# Patient Record
Sex: MALE | HISPANIC OR LATINO
[De-identification: names, ages, dates, MRNs, and addresses within clinical notes are randomized per-mention and may not be internally consistent; named-entity substitution may affect disease eponyms.]

---

## 2024-06-17 ENCOUNTER — DASHBOARD ENCOUNTERS (OUTPATIENT)
Age: 34
End: 2024-06-17

## 2024-06-17 ENCOUNTER — LAB OUTSIDE AN ENCOUNTER (OUTPATIENT)
Dept: URBAN - METROPOLITAN AREA CLINIC 72 | Facility: CLINIC | Age: 34
End: 2024-06-17

## 2024-06-17 ENCOUNTER — OFFICE VISIT (OUTPATIENT)
Dept: URBAN - METROPOLITAN AREA CLINIC 72 | Facility: CLINIC | Age: 34
End: 2024-06-17
Payer: SELF-PAY

## 2024-06-17 VITALS
WEIGHT: 284.4 LBS | TEMPERATURE: 97.5 F | HEART RATE: 68 BPM | SYSTOLIC BLOOD PRESSURE: 128 MMHG | DIASTOLIC BLOOD PRESSURE: 68 MMHG

## 2024-06-17 DIAGNOSIS — K21.9 GERD WITHOUT ESOPHAGITIS: ICD-10-CM

## 2024-06-17 DIAGNOSIS — A04.8 HELICOBACTER PYLORI (H. PYLORI): ICD-10-CM

## 2024-06-17 DIAGNOSIS — R19.7 ACUTE DIARRHEA: ICD-10-CM

## 2024-06-17 PROBLEM — 409966000: Status: ACTIVE | Noted: 2024-06-17

## 2024-06-17 PROBLEM — 266435005: Status: ACTIVE | Noted: 2024-06-17

## 2024-06-17 PROBLEM — 79922009: Status: ACTIVE | Noted: 2024-06-17

## 2024-06-17 PROBLEM — 721730009: Status: ACTIVE | Noted: 2024-06-17

## 2024-06-17 PROCEDURE — 99204 OFFICE O/P NEW MOD 45 MIN: CPT | Performed by: INTERNAL MEDICINE

## 2024-06-17 RX ORDER — CLARITHROMYCIN 500 MG/1
TAKE 1 TABLET BY MOUTH EVERY 12 HOURS FOR 10 DAYS TABLET, FILM COATED ORAL
Qty: 20 EACH | Refills: 0 | Status: ON HOLD | COMMUNITY

## 2024-06-17 RX ORDER — OMEPRAZOLE 40 MG/1
1 CAPSULE 30 MINUTES BEFORE BREAKFAST AND DINNER CAPSULE, DELAYED RELEASE ORAL TWICE DAILY
Qty: 180 | Refills: 0 | OUTPATIENT
Start: 2024-06-17

## 2024-06-17 RX ORDER — FAMOTIDINE 40 MG/1
TAKE 1 TABLET BY MOUTH ONCE DAILY AT BEDTIME TABLET, FILM COATED ORAL
Qty: 30 EACH | Refills: 1 | Status: ACTIVE | COMMUNITY

## 2024-06-17 RX ORDER — AMOXICILLIN 500 MG/1
TAKE 2 CAPSULES BY MOUTH TWICE DAILY FOR 10 DAYS CAPSULE ORAL
Qty: 40 EACH | Refills: 0 | Status: ON HOLD | COMMUNITY

## 2024-06-17 RX ORDER — SUCRALFATE 1 G/1
TAKE 1 TABLET BY MOUTH 4 TIMES DAILY TABLET ORAL
Qty: 120 EACH | Refills: 0 | Status: ACTIVE | COMMUNITY

## 2024-06-17 RX ORDER — OMEPRAZOLE 20 MG/1
TAKE 1 TABLET BY MOUTH TWICE DAILY BEFORE MEALS TABLET, DELAYED RELEASE ORAL
Qty: 30 EACH | Refills: 0 | Status: ON HOLD | COMMUNITY

## 2024-06-17 RX ORDER — FAMOTIDINE 40 MG/1
1 TABLET AT BEDTIME TABLET, FILM COATED ORAL ONCE A DAY
Qty: 90 TABLET | Refills: 3 | OUTPATIENT
Start: 2024-06-17

## 2024-06-17 NOTE — HPI-TODAY'S VISIT:
Patient is a 34 yo male here for epigastric pain. This has been going on for about 3 months with repeated ER visits with negative findings. Patient states that he had an at-home positive H Pylori test and was treated. A friend recently had H Pylori.  He has started feeling worse again - since stopping the antibiotics for H Pylori. He is having epigastric pain, and has a gurgling feeling in his throat. He feels like he gets SOB and belly hurts more at night. During the day he gets dizzy and feels weak.   5/14/24 - ED @[facility] - abd pain/chest pain/dizziness - on famotidine daily. Worse when he eats. Started on omeprazole 20 mg BID and carafate QID. Labs unremarkable.  5/21/24 - ED @[facility] - abd pain/gastritis with a take home test H Pylori (+); Patient given amoxixillin 500 g 2 capsule BID/clarithromycin 500 mg BID for 10 days.  6/4/24 - ED @[facility] - epigastric pain continuing despits H Pylori trmt. CT no acute findings. Labs normal.  6/11/24 - ED @[facility] - Lipase normal, CBC/CMP normal; famotidine 20 mg BID; Sucralafate QID;

## 2024-07-01 ENCOUNTER — OFFICE VISIT (OUTPATIENT)
Dept: URBAN - METROPOLITAN AREA MEDICAL CENTER 40 | Facility: MEDICAL CENTER | Age: 34
End: 2024-07-01
Payer: SELF-PAY

## 2024-07-01 DIAGNOSIS — K22.89 OTHER SPECIFIED DISEASE OF ESOPHAGUS: ICD-10-CM

## 2024-07-01 DIAGNOSIS — K29.60 ADENOPAPILLOMATOSIS GASTRICA: ICD-10-CM

## 2024-07-01 PROCEDURE — 43239 EGD BIOPSY SINGLE/MULTIPLE: CPT | Performed by: INTERNAL MEDICINE

## 2024-07-01 RX ORDER — AMOXICILLIN 500 MG/1
TAKE 2 CAPSULES BY MOUTH TWICE DAILY FOR 10 DAYS CAPSULE ORAL
Qty: 40 EACH | Refills: 0 | Status: ON HOLD | COMMUNITY

## 2024-07-01 RX ORDER — CLARITHROMYCIN 500 MG/1
TAKE 1 TABLET BY MOUTH EVERY 12 HOURS FOR 10 DAYS TABLET, FILM COATED ORAL
Qty: 20 EACH | Refills: 0 | Status: ON HOLD | COMMUNITY

## 2024-07-01 RX ORDER — SUCRALFATE 1 G/1
TAKE 1 TABLET BY MOUTH 4 TIMES DAILY TABLET ORAL
Qty: 120 EACH | Refills: 0 | Status: ACTIVE | COMMUNITY

## 2024-07-01 RX ORDER — OMEPRAZOLE 40 MG/1
1 CAPSULE 30 MINUTES BEFORE BREAKFAST AND DINNER CAPSULE, DELAYED RELEASE ORAL TWICE DAILY
Qty: 180 | Refills: 0 | Status: ACTIVE | COMMUNITY
Start: 2024-06-17

## 2024-07-01 RX ORDER — FAMOTIDINE 40 MG/1
TAKE 1 TABLET BY MOUTH ONCE DAILY AT BEDTIME TABLET, FILM COATED ORAL
Qty: 30 EACH | Refills: 1 | Status: ACTIVE | COMMUNITY

## 2024-07-01 RX ORDER — FAMOTIDINE 40 MG/1
1 TABLET AT BEDTIME TABLET, FILM COATED ORAL ONCE A DAY
Qty: 90 TABLET | Refills: 3 | Status: ACTIVE | COMMUNITY
Start: 2024-06-17

## 2024-07-01 RX ORDER — OMEPRAZOLE 20 MG/1
TAKE 1 TABLET BY MOUTH TWICE DAILY BEFORE MEALS TABLET, DELAYED RELEASE ORAL
Qty: 30 EACH | Refills: 0 | Status: ON HOLD | COMMUNITY

## 2024-07-15 ENCOUNTER — TELEPHONE ENCOUNTER (OUTPATIENT)
Dept: URBAN - METROPOLITAN AREA CLINIC 6 | Facility: CLINIC | Age: 34
End: 2024-07-15

## 2024-07-15 ENCOUNTER — OFFICE VISIT (OUTPATIENT)
Dept: URBAN - METROPOLITAN AREA CLINIC 72 | Facility: CLINIC | Age: 34
End: 2024-07-15
Payer: SELF-PAY

## 2024-07-15 VITALS
TEMPERATURE: 97.9 F | SYSTOLIC BLOOD PRESSURE: 145 MMHG | WEIGHT: 278.6 LBS | HEART RATE: 59 BPM | DIASTOLIC BLOOD PRESSURE: 93 MMHG

## 2024-07-15 DIAGNOSIS — K21.9 GERD WITHOUT ESOPHAGITIS: ICD-10-CM

## 2024-07-15 DIAGNOSIS — R10.13 EPIGASTRIC PAIN: ICD-10-CM

## 2024-07-15 PROCEDURE — 99214 OFFICE O/P EST MOD 30 MIN: CPT | Performed by: INTERNAL MEDICINE

## 2024-07-15 RX ORDER — FAMOTIDINE 40 MG/1
1 TABLET AT BEDTIME TABLET, FILM COATED ORAL ONCE A DAY
Qty: 90 TABLET | Refills: 3 | Status: ACTIVE | COMMUNITY
Start: 2024-06-17

## 2024-07-15 RX ORDER — AMOXICILLIN 500 MG/1
TAKE 2 CAPSULES BY MOUTH TWICE DAILY FOR 10 DAYS CAPSULE ORAL
Qty: 40 EACH | Refills: 0 | Status: ON HOLD | COMMUNITY

## 2024-07-15 RX ORDER — CLARITHROMYCIN 500 MG/1
TAKE 1 TABLET BY MOUTH EVERY 12 HOURS FOR 10 DAYS TABLET, FILM COATED ORAL
Qty: 20 EACH | Refills: 0 | Status: ON HOLD | COMMUNITY

## 2024-07-15 RX ORDER — FAMOTIDINE 40 MG/1
1 TABLET AT BEDTIME TABLET, FILM COATED ORAL ONCE A DAY
Qty: 90 TABLET | Refills: 3 | OUTPATIENT
Start: 2024-07-15

## 2024-07-15 RX ORDER — OMEPRAZOLE 40 MG/1
1 CAPSULE 30 MINUTES BEFORE BREAKFAST AND DINNER CAPSULE, DELAYED RELEASE ORAL TWICE DAILY
Qty: 180 | Refills: 0 | Status: ACTIVE | COMMUNITY
Start: 2024-06-17

## 2024-07-15 RX ORDER — OMEPRAZOLE 40 MG/1
1 CAPSULE 30 MINUTES BEFORE MORNING MEAL CAPSULE, DELAYED RELEASE ORAL TWICE DAILY
Qty: 180 | Refills: 0 | OUTPATIENT
Start: 2024-07-15

## 2024-07-15 RX ORDER — FAMOTIDINE 40 MG/1
TAKE 1 TABLET BY MOUTH ONCE DAILY AT BEDTIME TABLET, FILM COATED ORAL
Qty: 30 EACH | Refills: 1 | Status: ON HOLD | COMMUNITY

## 2024-07-15 RX ORDER — OMEPRAZOLE 20 MG/1
TAKE 1 TABLET BY MOUTH TWICE DAILY BEFORE MEALS TABLET, DELAYED RELEASE ORAL
Qty: 30 EACH | Refills: 0 | Status: ON HOLD | COMMUNITY

## 2024-07-15 RX ORDER — SUCRALFATE 1 G/1
TAKE 1 TABLET BY MOUTH 4 TIMES DAILY TABLET ORAL
Qty: 120 EACH | Refills: 0 | Status: ON HOLD | COMMUNITY

## 2024-07-15 NOTE — HPI-OTHER HISTORIES
5/14/24 - ED @[facility] - abd pain/chest pain/dizziness - on famotidine daily. Worse when he eats. Started on omeprazole 20 mg BID and carafate QID. Labs unremarkable.  5/21/24 - ED @[facility] - abd pain/gastritis with a take home test H Pylori (+); Patient given amoxixillin 500 g 2 capsule BID/clarithromycin 500 mg BID for 10 days.  6/4/24 - ED @[facility] - epigastric pain continuing despits H Pylori trmt. CT no acute findings. Labs normal.  6/11/24 - ED @[facility] - Lipase normal, CBC/CMP normal; famotidine 20 mg BID; Sucralafate QID

## 2024-07-15 NOTE — HPI-TODAY'S VISIT:
Patient is a 33 yo male who recently underwent EGD for continued epigastric pain after being treated for H Pylori. Patient is taking omeprazole 40 mg in the morning only and famotidine 40 mg at night before bed. His sx are getting better, but he still has stomach discomfort at times. He was prescribed to take the PPI BID so he is going to start doing that.   EGD - 7/1/24 - esophagus normal. gastritis - moderately severe; normal duodenum. Path: focally active gastritis - neg for H Pylori. mild chronic inflammation of esophagus.

## 2024-10-14 ENCOUNTER — OFFICE VISIT (OUTPATIENT)
Dept: URBAN - METROPOLITAN AREA CLINIC 72 | Facility: CLINIC | Age: 34
End: 2024-10-14
Payer: SELF-PAY

## 2024-10-14 VITALS
TEMPERATURE: 97.9 F | HEART RATE: 80 BPM | SYSTOLIC BLOOD PRESSURE: 162 MMHG | DIASTOLIC BLOOD PRESSURE: 93 MMHG | WEIGHT: 277.8 LBS

## 2024-10-14 DIAGNOSIS — K21.9 GERD WITHOUT ESOPHAGITIS: ICD-10-CM

## 2024-10-14 DIAGNOSIS — K29.00 ACUTE GASTRITIS WITHOUT HEMORRHAGE, UNSPECIFIED GASTRITIS TYPE: ICD-10-CM

## 2024-10-14 DIAGNOSIS — R19.7 ACUTE DIARRHEA: ICD-10-CM

## 2024-10-14 PROCEDURE — 99214 OFFICE O/P EST MOD 30 MIN: CPT

## 2024-10-14 RX ORDER — FAMOTIDINE 40 MG/1
1 TABLET AT BEDTIME TABLET, FILM COATED ORAL ONCE A DAY
Qty: 90 TABLET | Refills: 3 | Status: ACTIVE | COMMUNITY
Start: 2024-07-15

## 2024-10-14 RX ORDER — OMEPRAZOLE 40 MG/1
1 CAPSULE 30 MINUTES BEFORE MORNING MEAL CAPSULE, DELAYED RELEASE ORAL TWICE DAILY
Qty: 180 | Refills: 0 | Status: ON HOLD | COMMUNITY
Start: 2024-07-15

## 2024-10-14 RX ORDER — SUCRALFATE 1 G/1
TAKE 1 TABLET BY MOUTH 4 TIMES DAILY TABLET ORAL
Qty: 120 EACH | Refills: 0 | Status: ON HOLD | COMMUNITY

## 2024-10-14 RX ORDER — FAMOTIDINE 40 MG/1
TAKE 1 TABLET BY MOUTH ONCE DAILY AT BEDTIME TABLET, FILM COATED ORAL
Qty: 30 EACH | Refills: 1 | Status: ON HOLD | COMMUNITY

## 2024-10-14 RX ORDER — AMOXICILLIN 500 MG/1
TAKE 2 CAPSULES BY MOUTH TWICE DAILY FOR 10 DAYS CAPSULE ORAL
Qty: 40 EACH | Refills: 0 | Status: ON HOLD | COMMUNITY

## 2024-10-14 RX ORDER — CLARITHROMYCIN 500 MG/1
TAKE 1 TABLET BY MOUTH EVERY 12 HOURS FOR 10 DAYS TABLET, FILM COATED ORAL
Qty: 20 EACH | Refills: 0 | Status: ON HOLD | COMMUNITY

## 2024-10-14 RX ORDER — OMEPRAZOLE 20 MG/1
TAKE 1 TABLET BY MOUTH TWICE DAILY BEFORE MEALS TABLET, DELAYED RELEASE ORAL
Qty: 30 EACH | Refills: 0 | Status: ON HOLD | COMMUNITY

## 2024-10-14 RX ORDER — OMEPRAZOLE 40 MG/1
1 CAPSULE 30 MINUTES BEFORE BREAKFAST AND DINNER CAPSULE, DELAYED RELEASE ORAL TWICE DAILY
Qty: 180 | Refills: 0 | Status: ON HOLD | COMMUNITY
Start: 2024-06-17

## 2024-10-14 RX ORDER — OMEPRAZOLE 40 MG/1
1 CAPSULE 1/2 TO 1 HOUR BEFORE MORNING MEAL CAPSULE, DELAYED RELEASE ORAL ONCE A DAY
Qty: 90 | Refills: 3 | OUTPATIENT
Start: 2024-10-14

## 2024-10-14 RX ORDER — FAMOTIDINE 40 MG/1
1 TABLET AT BEDTIME TABLET, FILM COATED ORAL ONCE A DAY
Qty: 90 TABLET | Refills: 3 | Status: ON HOLD | COMMUNITY
Start: 2024-06-17

## 2024-10-14 NOTE — HPI-OTHER HISTORIES
Procedures: Procedures:  EGD - 7/1/24 - esophagus normal. gastritis - moderately severe; normal duodenum. Path: focally active gastritis - neg for H Pylori. mild chronic inflammation of esophagus.  Hospitalizations:  5/14/24 - ED @[facility] - abd pain/chest pain/dizziness - on famotidine daily. Worse when he eats. Started on omeprazole 20 mg BID and carafate QID. Labs unremarkable.  5/21/24 - ED @[facility] - abd pain/gastritis with a take home test H Pylori (+); Patient given amoxixillin 500 g 2 capsule BID/clarithromycin 500 mg BID for 10 days.  6/4/24 - ED @[facility] - epigastric pain continuing despits H Pylori trmt. CT no acute findings. Labs normal.  6/11/24 - ED @[facility] - Lipase normal, CBC/CMP normal; famotidine 20 mg BID; Sucralafate QID

## 2024-10-14 NOTE — HPI-TODAY'S VISIT:
Patient is a 34-year-old male here for 3-month follow-up secondary to severe gastritis currently being treated with 40 mg omeprazole twice daily and famotidine 40 mg nightly.  This office visit is to hopefully titrate down on the amount of omeprazole that he is taking on a regular basis.  Patient states that his sx are worse in the morning, but he is willing to titrate down on his PPI.   He is still having issues with diarrhea. He is going 3-4 times daily and it is always [place] scale 7. This started about a month ago. He denies starting any new medicatiosn. He stopped his omeprazole for a short period to see if this was the problem (1 month), but the diarrhea didnt change and his GERD was terrible so he restarted it. He has notbeen on any antibiotic recently. He has started pepto and tagkes three swigs daily and that helps the stools get to a [place] scale 5.

## 2024-10-14 NOTE — EXAM-FUNCTIONAL ASSESSMENT
General--no acute distress, normal appearance Eyes--anicteric, no pallor HENT--normocephalic, atraumatic head Neck--no lymphadenopathy, symmetric Chest--non labored, equal chest rise Heart--regular rate Abdomen--soft, non tender, non distended, no organomegaly Skin--no rashes, no jaundice Neurologic--Alert and oriented x 3, answers questions appropriately Psychiatric--stable mood, appropriate affect 
Age: 87 years old or older

## 2024-11-25 PROBLEM — 8493009: Status: ACTIVE | Noted: 2024-11-25

## 2024-11-26 ENCOUNTER — OFFICE VISIT (OUTPATIENT)
Dept: URBAN - METROPOLITAN AREA CLINIC 72 | Facility: CLINIC | Age: 34
End: 2024-11-26
Payer: SELF-PAY

## 2024-11-26 VITALS
SYSTOLIC BLOOD PRESSURE: 142 MMHG | HEIGHT: 68 IN | HEART RATE: 80 BPM | WEIGHT: 277 LBS | BODY MASS INDEX: 41.98 KG/M2 | DIASTOLIC BLOOD PRESSURE: 74 MMHG | TEMPERATURE: 97.7 F

## 2024-11-26 DIAGNOSIS — R19.7 FREQUENT DIARRHEA: ICD-10-CM

## 2024-11-26 DIAGNOSIS — K29.50 OTHER CHRONIC GASTRITIS WITHOUT HEMORRHAGE: ICD-10-CM

## 2024-11-26 DIAGNOSIS — K58.0 IRRITABLE BOWEL SYNDROME WITH DIARRHEA: ICD-10-CM

## 2024-11-26 PROBLEM — 271864008: Status: ACTIVE | Noted: 2024-11-26

## 2024-11-26 PROBLEM — 62315008: Status: ACTIVE | Noted: 2024-11-26

## 2024-11-26 PROBLEM — 197125005: Status: ACTIVE | Noted: 2024-11-26

## 2024-11-26 PROCEDURE — 99214 OFFICE O/P EST MOD 30 MIN: CPT

## 2024-11-26 RX ORDER — FAMOTIDINE 40 MG/1
TAKE 1 TABLET BY MOUTH ONCE DAILY AT BEDTIME TABLET, FILM COATED ORAL
Qty: 30 EACH | Refills: 1 | Status: ON HOLD | COMMUNITY

## 2024-11-26 RX ORDER — AMOXICILLIN 500 MG/1
TAKE 2 CAPSULES BY MOUTH TWICE DAILY FOR 10 DAYS CAPSULE ORAL
Qty: 40 EACH | Refills: 0 | Status: ON HOLD | COMMUNITY

## 2024-11-26 RX ORDER — OMEPRAZOLE 40 MG/1
1 CAPSULE 30 MINUTES BEFORE MORNING MEAL CAPSULE, DELAYED RELEASE ORAL TWICE DAILY
Qty: 180 | Refills: 0 | Status: ON HOLD | COMMUNITY
Start: 2024-07-15

## 2024-11-26 RX ORDER — OMEPRAZOLE 20 MG/1
TAKE 1 TABLET BY MOUTH TWICE DAILY BEFORE MEALS TABLET, DELAYED RELEASE ORAL
Qty: 30 EACH | Refills: 0 | Status: ON HOLD | COMMUNITY

## 2024-11-26 RX ORDER — CLARITHROMYCIN 500 MG/1
TAKE 1 TABLET BY MOUTH EVERY 12 HOURS FOR 10 DAYS TABLET, FILM COATED ORAL
Qty: 20 EACH | Refills: 0 | Status: ON HOLD | COMMUNITY

## 2024-11-26 RX ORDER — OMEPRAZOLE 40 MG/1
1 CAPSULE 30 MINUTES BEFORE BREAKFAST AND DINNER CAPSULE, DELAYED RELEASE ORAL TWICE DAILY
Qty: 180 | Refills: 0 | Status: ON HOLD | COMMUNITY
Start: 2024-06-17

## 2024-11-26 RX ORDER — OMEPRAZOLE 40 MG/1
1 CAPSULE 1/2 TO 1 HOUR BEFORE MORNING MEAL CAPSULE, DELAYED RELEASE ORAL ONCE A DAY
Qty: 90 | Refills: 3 | Status: ACTIVE | COMMUNITY
Start: 2024-10-14

## 2024-11-26 RX ORDER — FAMOTIDINE 40 MG/1
1 TABLET AT BEDTIME TABLET, FILM COATED ORAL ONCE A DAY
Qty: 90 TABLET | Refills: 3 | Status: ON HOLD | COMMUNITY
Start: 2024-06-17

## 2024-11-26 RX ORDER — FAMOTIDINE 40 MG/1
1 TABLET AT BEDTIME TABLET, FILM COATED ORAL ONCE A DAY
Qty: 90 TABLET | Refills: 3 | Status: ACTIVE | COMMUNITY
Start: 2024-07-15

## 2024-11-26 RX ORDER — SUCRALFATE 1 G/1
TAKE 1 TABLET BY MOUTH 4 TIMES DAILY TABLET ORAL
Qty: 120 EACH | Refills: 0 | Status: ON HOLD | COMMUNITY

## 2024-11-26 NOTE — HPI-OTHER HISTORIES
Procedures:  EGD - 7/1/24 - esophagus normal. gastritis - moderately severe; normal duodenum. Path: focally active gastritis - neg for H Pylori. mild chronic inflammation of esophagus.  Labs: 11/3/2024-stool studies: Bacterial and viral panel negative, ova and parasite negative  11/3/2024-WBC 8.3, RBC 5.1, hemoglobin 14.3, hematocrit 43.7, MCV 85.5, platelets 249, , K4.1, BUN 14, creatinine 0.9, total bili 0.6, AST 16, ALT 23, ALP 77, lipase 31  DI: 11/3/2024-CT abdomen and pelvis-no acute abnormality within the abdomen or pelvis.  11/3/2024-chest x-ray no focal opacity or pleural effusion.  Cardiomediastinal silhouette appears normal.  No acute cardiopulmonary disease.  Hospitalizations:  11/3/2024-ED at [facility]: Patient complaining of dizziness secondary to dehydration due to persistent diarrhea for 5 weeks.  Stool studies negative.  Reporting frequent watery/mucoid stools without blood.  Reports 20 pound weight loss in 1 month.  5/14/24 - ED @[facility] - abd pain/chest pain/dizziness - on famotidine daily. Worse when he eats. Started on omeprazole 20 mg BID and carafate QID. Labs unremarkable.  5/21/24 - ED @[facility] - abd pain/gastritis with a take home test H Pylori (+); Patient given amoxixillin 500 g 2 capsule BID/clarithromycin 500 mg BID for 10 days.  6/4/24 - ED @[facility] - epigastric pain continuing despits H Pylori trmt. CT no acute findings. Labs normal.  6/11/24 - ED @[facility] - Lipase normal, CBC/CMP normal; famotidine 20 mg BID; Sucralafate QID

## 2024-11-26 NOTE — HPI-TODAY'S VISIT:
Patient is a 34-year-old male last seen in the office on 10/14/2024 for acute gastritis, acute diarrhea, GERD.  Patient had EGD which diagnosed the gastritis which was negative for H. pylori.  Patient was started on omeprazole 40 mg twice daily and famotidine nightly.  This resolved his symptoms patient was to start decreasing the omeprazole down to once a day and using Gaviscon as needed.  Stool studies were ordered but it appears they were not done.  Patient reported to [facility] ER on 11/3/2024 with complaints of dizziness and diarrhea.  Reported a 20 pound weight loss in 1 month.  Stool studies were done and GI panel was negative.  Ova and parasite negative.  Weight from July and October compared to today's weight are the same.  No dramatic weight loss seen.  Patient states that since June he has expereinced diarrhea. He states that he stopped PPI for 14 days and diarrhea did not change. He states that he has not had a formed stool at all. Patient states that he doesnt remember telling the ER doctor that he was having mucus. He states that stool is [place] scale 5-7. He is having left sided pain.

## 2025-01-02 ENCOUNTER — OFFICE VISIT (OUTPATIENT)
Dept: URBAN - METROPOLITAN AREA CLINIC 72 | Facility: CLINIC | Age: 35
End: 2025-01-02

## 2025-01-02 NOTE — HPI-OTHER HISTORIES
Procedures:  EGD - 7/1/24 - esophagus normal. gastritis - moderately severe; normal duodenum. Path: focally active gastritis - neg for H Pylori. mild chronic inflammation of esophagus.  Labs: 11/3/2024-stool studies: Bacterial and viral panel negative, ova and parasite negative  11/3/2024-WBC 8.3, RBC 5.1, hemoglobin 14.3, hematocrit 43.7, MCV 85.5, platelets 249, , K4.1, BUN 14, creatinine 0.9, total bili 0.6, AST 16, ALT 23, ALP 77, lipase 31  DI: 11/3/2024-CT abdomen and pelvis-no acute abnormality within the abdomen or pelvis.  11/3/2024-chest x-ray no focal opacity or pleural effusion.  Cardiomediastinal silhouette appears normal.  No acute cardiopulmonary disease.  Hospitalizations:  11/3/2024-ED at [facility]: Patient complaining of dizziness secondary to dehydration due to persistent diarrhea for 5 weeks.  Stool studies negative.  Reporting frequent watery/mucoid stools without blood.  Reports 20 pound weight loss in 1 month.  5/14/24 - ED @[facility] - abd pain/chest pain/dizziness - on famotidine daily. Worse when he eats. Started on omeprazole 20 mg BID and carafate QID. Labs unremarkable.  5/21/24 - ED @[facility] - abd pain/gastritis with a take home test H Pylori (+); Patient given amoxixillin 500 g 2 capsule BID/clarithromycin 500 mg BID for 10 days.  6/4/24 - ED @[facility] - epigastric pain continuing despits H Pylori trmt. CT no acute findings. Labs normal.  6/11/24 - ED @[facility] - Lipase normal, CBC/CMP normal; famotidine 20 mg BID; Sucralafate QID

## 2025-01-02 NOTE — HPI-TODAY'S VISIT:
Patient is a 34-year-old male who was last seen in the office on 11/26/2024 as part of hospital follow-up for gastritis, and IBS-D.  Patient has continued on PPI therapy because when he tries to wean off and his gastritis symptoms increase.  EGD performed showed severe gastritis with no H. pylori.  Patient also has complaints of continued diarrhea.  He was told to start fiber daily and can use Pepto-Bismol and Imodium as needed.  I told him that I think his diarrhea may be overflow and told him that MiraLAX and fiber may be a better option for him.

## 2025-01-29 ENCOUNTER — OFFICE VISIT (OUTPATIENT)
Dept: URBAN - METROPOLITAN AREA CLINIC 72 | Facility: CLINIC | Age: 35
End: 2025-01-29
Payer: SELF-PAY

## 2025-01-29 VITALS
SYSTOLIC BLOOD PRESSURE: 156 MMHG | HEART RATE: 88 BPM | HEIGHT: 68 IN | TEMPERATURE: 97.8 F | WEIGHT: 286 LBS | BODY MASS INDEX: 43.35 KG/M2 | DIASTOLIC BLOOD PRESSURE: 74 MMHG

## 2025-01-29 DIAGNOSIS — R10.32 LEFT LOWER QUADRANT ABDOMINAL PAIN: ICD-10-CM

## 2025-01-29 DIAGNOSIS — K62.5 PAINLESS RECTAL BLEEDING: ICD-10-CM

## 2025-01-29 DIAGNOSIS — K58.0 IRRITABLE BOWEL SYNDROME WITH DIARRHEA: ICD-10-CM

## 2025-01-29 DIAGNOSIS — K21.9 GERD WITHOUT ESOPHAGITIS: ICD-10-CM

## 2025-01-29 DIAGNOSIS — K29.50 OTHER CHRONIC GASTRITIS WITHOUT HEMORRHAGE: ICD-10-CM

## 2025-01-29 DIAGNOSIS — R10.12 LEFT UPPER QUADRANT ABDOMINAL PAIN: ICD-10-CM

## 2025-01-29 PROBLEM — 301716002: Status: ACTIVE | Noted: 2025-01-29

## 2025-01-29 PROBLEM — 301715003: Status: ACTIVE | Noted: 2025-01-29

## 2025-01-29 PROCEDURE — 99214 OFFICE O/P EST MOD 30 MIN: CPT

## 2025-01-29 RX ORDER — AMOXICILLIN 500 MG/1
TAKE 2 CAPSULES BY MOUTH TWICE DAILY FOR 10 DAYS CAPSULE ORAL
Qty: 40 EACH | Refills: 0 | Status: ON HOLD | COMMUNITY

## 2025-01-29 RX ORDER — FAMOTIDINE 40 MG/1
1 TABLET AT BEDTIME TABLET, FILM COATED ORAL ONCE A DAY
Qty: 90 TABLET | Refills: 3 | Status: ACTIVE | COMMUNITY
Start: 2024-07-15

## 2025-01-29 RX ORDER — OMEPRAZOLE 40 MG/1
1 CAPSULE 1/2 TO 1 HOUR BEFORE MORNING MEAL CAPSULE, DELAYED RELEASE ORAL ONCE A DAY
Qty: 90 | Refills: 3 | Status: ACTIVE | COMMUNITY
Start: 2024-10-14

## 2025-01-29 RX ORDER — CLARITHROMYCIN 500 MG/1
TAKE 1 TABLET BY MOUTH EVERY 12 HOURS FOR 10 DAYS TABLET, FILM COATED ORAL
Qty: 20 EACH | Refills: 0 | Status: ON HOLD | COMMUNITY

## 2025-01-29 RX ORDER — FAMOTIDINE 40 MG/1
1 TABLET AT BEDTIME TABLET, FILM COATED ORAL ONCE A DAY
Qty: 90 TABLET | Refills: 3 | Status: ON HOLD | COMMUNITY
Start: 2024-06-17

## 2025-01-29 RX ORDER — SUCRALFATE 1 G/1
TAKE 1 TABLET BY MOUTH 4 TIMES DAILY TABLET ORAL
Qty: 120 EACH | Refills: 0 | Status: ON HOLD | COMMUNITY

## 2025-01-29 RX ORDER — FAMOTIDINE 40 MG/1
TAKE 1 TABLET BY MOUTH ONCE DAILY AT BEDTIME TABLET, FILM COATED ORAL
Qty: 30 EACH | Refills: 1 | Status: ON HOLD | COMMUNITY

## 2025-01-29 RX ORDER — OMEPRAZOLE 40 MG/1
1 CAPSULE 30 MINUTES BEFORE BREAKFAST AND DINNER CAPSULE, DELAYED RELEASE ORAL TWICE DAILY
Qty: 180 | Refills: 0 | Status: ON HOLD | COMMUNITY
Start: 2024-06-17

## 2025-01-29 RX ORDER — OMEPRAZOLE 40 MG/1
1 CAPSULE 30 MINUTES BEFORE MORNING MEAL CAPSULE, DELAYED RELEASE ORAL TWICE DAILY
Qty: 180 | Refills: 0 | Status: ON HOLD | COMMUNITY
Start: 2024-07-15

## 2025-01-29 RX ORDER — OMEPRAZOLE 20 MG/1
TAKE 1 TABLET BY MOUTH TWICE DAILY BEFORE MEALS TABLET, DELAYED RELEASE ORAL
Qty: 30 EACH | Refills: 0 | Status: ON HOLD | COMMUNITY

## 2025-01-29 NOTE — HPI-OTHER HISTORIES
Procedures:  EGD - 7/1/24 - esophagus normal. gastritis - moderately severe; normal duodenum. Path: focally active gastritis - neg for H Pylori. mild chronic inflammation of esophagus.  Labs: 11/3/2024-stool studies: Bacterial and viral panel negative, ova and parasite negative  11/3/2024-WBC 8.3, RBC 5.1, hemoglobin 14.3, hematocrit 43.7, MCV 85.5, platelets 249, , K4.1, BUN 14, creatinine 0.9, total bili 0.6, AST 16, ALT 23, ALP 77, lipase 31  DI: 11/3/2024-CT abdomen and pelvis-no acute abnormality within the abdomen or pelvis.  11/3/2024-chest x-ray no focal opacity or pleural effusion.  Cardiomediastinal silhouette appears normal.  No acute cardiopulmonary disease.  Hospitalizations:  11/3/2024-ED at [facility]: Patient complaining of dizziness secondary to dehydration due to persistent diarrhea for 5 weeks.  Stool studies negative.  Reporting frequent watery/mucoid stools without blood.  Reports 20 pound weight loss in 1 month.  5/14/24 - ED @[facility] - abd pain/chest pain/dizziness - on famotidine daily. Worse when he eats. Started on omeprazole 20 mg BID and carafate QID. Labs unremarkable.  5/21/24 - ED @[facility] - abd pain/gastritis with a take home test H Pylori (+); Patient given amoxixillin 500 g 2 capsule BID/clarithromycin 500 mg BID for 10 days.  6/4/24 - ED @[facility] - epigastric pain continuing despits H Pylori trmt. CT no acute findings. Labs normal.  6/11/24 - ED @[facility] - Lipase normal, CBC/CMP normal; famotidine 20 mg BID; Sucralafate QID

## 2025-01-29 NOTE — HPI-TODAY'S VISIT:
Patient is a 34-year-old male last seen in the office on 11/26/2024 for persistent epigastric pain with gastritis and frequent diarrhea.  Patient is maintained on PPI and famotidine.  Patient was asked to start Pepto-Bismol or Imodium or discussed a colonoscopy for frequent diarrhea.  I thought the patient was likely having overflow diarrhea and I asked him to start fiber and MiraLAX daily.  Patient is seen with sister today. Translation services used.   Patient is seen today and is feeling better. He is no longer having diarrhea, but the abdominal pain is still there. He is having epigastric pain and gerneralized abdominal pain. He is taking fiber and miralax daily. He feels like he is having a complete BM. He is having BM's 2-3 times daily usually more in mitzi morning, but sometimes at night. Left side predominant pain. Patient endorses blood in the toilet and when wiping. 15-days ago - the blood started  for 3 days and then went away and then came back for 3 days. He states he was not straining or pushing. Blood was bright red blood.

## 2025-03-06 ENCOUNTER — OFFICE VISIT (OUTPATIENT)
Dept: URBAN - METROPOLITAN AREA CLINIC 72 | Facility: CLINIC | Age: 35
End: 2025-03-06
Payer: SELF-PAY

## 2025-03-06 VITALS
BODY MASS INDEX: 43.47 KG/M2 | TEMPERATURE: 97.3 F | DIASTOLIC BLOOD PRESSURE: 77 MMHG | HEART RATE: 67 BPM | SYSTOLIC BLOOD PRESSURE: 132 MMHG | HEIGHT: 68 IN | WEIGHT: 286.8 LBS

## 2025-03-06 DIAGNOSIS — K29.60 REFLUX GASTRITIS: ICD-10-CM

## 2025-03-06 DIAGNOSIS — R19.7 FREQUENT DIARRHEA: ICD-10-CM

## 2025-03-06 DIAGNOSIS — K30 FUNCTIONAL DYSPEPSIA: ICD-10-CM

## 2025-03-06 DIAGNOSIS — R10.32 LEFT LOWER QUADRANT ABDOMINAL PAIN: ICD-10-CM

## 2025-03-06 DIAGNOSIS — R19.5 MUCUS IN STOOL: ICD-10-CM

## 2025-03-06 DIAGNOSIS — K58.1 IRRITABLE BOWEL SYNDROME WITH CONSTIPATION: ICD-10-CM

## 2025-03-06 DIAGNOSIS — R10.12 LEFT UPPER QUADRANT ABDOMINAL PAIN: ICD-10-CM

## 2025-03-06 PROBLEM — 440630006: Status: ACTIVE | Noted: 2025-03-06

## 2025-03-06 PROBLEM — 72950008: Status: ACTIVE | Noted: 2025-03-06

## 2025-03-06 PROBLEM — 3696007: Status: ACTIVE | Noted: 2025-03-06

## 2025-03-06 PROCEDURE — 99214 OFFICE O/P EST MOD 30 MIN: CPT | Performed by: INTERNAL MEDICINE

## 2025-03-06 RX ORDER — FAMOTIDINE 40 MG/1
TAKE 1 TABLET BY MOUTH ONCE DAILY AT BEDTIME TABLET, FILM COATED ORAL
Qty: 30 EACH | Refills: 1 | Status: ON HOLD | COMMUNITY

## 2025-03-06 RX ORDER — OMEPRAZOLE 40 MG/1
1 CAPSULE 30 MINUTES BEFORE BREAKFAST AND DINNER CAPSULE, DELAYED RELEASE ORAL TWICE DAILY
Qty: 180 | Refills: 0 | Status: ON HOLD | COMMUNITY
Start: 2024-06-17

## 2025-03-06 RX ORDER — OMEPRAZOLE 40 MG/1
1 CAPSULE 1/2 TO 1 HOUR BEFORE MORNING MEAL CAPSULE, DELAYED RELEASE ORAL ONCE A DAY
Qty: 90 | Refills: 3 | Status: ACTIVE | COMMUNITY
Start: 2024-10-14

## 2025-03-06 RX ORDER — SUCRALFATE 1 G/1
TAKE 1 TABLET BY MOUTH 4 TIMES DAILY TABLET ORAL
Qty: 120 EACH | Refills: 0 | Status: ON HOLD | COMMUNITY

## 2025-03-06 RX ORDER — OMEPRAZOLE 40 MG/1
1 CAPSULE 30 MINUTES BEFORE MORNING MEAL CAPSULE, DELAYED RELEASE ORAL TWICE DAILY
Qty: 180 | Refills: 0 | Status: ON HOLD | COMMUNITY
Start: 2024-07-15

## 2025-03-06 RX ORDER — FAMOTIDINE 40 MG/1
1 TABLET AT BEDTIME TABLET, FILM COATED ORAL ONCE A DAY
Qty: 90 TABLET | Refills: 3 | Status: ON HOLD | COMMUNITY
Start: 2024-06-17

## 2025-03-06 RX ORDER — CLARITHROMYCIN 500 MG/1
TAKE 1 TABLET BY MOUTH EVERY 12 HOURS FOR 10 DAYS TABLET, FILM COATED ORAL
Qty: 20 EACH | Refills: 0 | Status: ON HOLD | COMMUNITY

## 2025-03-06 RX ORDER — FAMOTIDINE 40 MG/1
1 TABLET AT BEDTIME TABLET, FILM COATED ORAL ONCE A DAY
Qty: 30 TABLET | Refills: 0 | OUTPATIENT
Start: 2025-03-06

## 2025-03-06 RX ORDER — AMOXICILLIN 500 MG/1
TAKE 2 CAPSULES BY MOUTH TWICE DAILY FOR 10 DAYS CAPSULE ORAL
Qty: 40 EACH | Refills: 0 | Status: ON HOLD | COMMUNITY

## 2025-03-06 RX ORDER — OMEPRAZOLE 20 MG/1
TAKE 1 TABLET BY MOUTH TWICE DAILY BEFORE MEALS TABLET, DELAYED RELEASE ORAL
Qty: 30 EACH | Refills: 0 | Status: ON HOLD | COMMUNITY

## 2025-03-06 NOTE — HPI-TODAY'S VISIT:
Patient is a 34-year-old male last seen in the office on 1/29/2025 for persistent epigastric pain with gastritis and frequent diarrhea.  Patient has been maintained on PPI and famotidine.  Patient was also having frequent diarrhea which I thought could be overflow so I asked patient to start fiber and MiraLAX daily, and add a stimulant every 3 days.  Asked him to reassess in 4 weeks for his painless rectal bleeding, lower quadrant abdominal pain, and what I thought to be overflow diarrhea.  If patient is still having symptoms consider colonoscopy.  Translation services used for this appointment.  Patient is seen today and he states that the stomach and bowels have gotten better. For the bowels, he is taking fiber and miralax daily, but he only took mitzi stimulant for 2 weeks. He is having daily BM's and feels like he is having complete BM's. He still has a lot of epigastric pain in the morning. He is taking omeprazole 40 mg in the evening. He is taking Gaviscon before bed. No caffeine. No EtOH. Doesnt smoke. No tomato-based products. Occasional jonathan. No NSAIDs.

## 2025-04-24 ENCOUNTER — OFFICE VISIT (OUTPATIENT)
Dept: URBAN - METROPOLITAN AREA CLINIC 72 | Facility: CLINIC | Age: 35
End: 2025-04-24

## 2025-04-24 RX ORDER — FAMOTIDINE 40 MG/1
1 TABLET AT BEDTIME TABLET, FILM COATED ORAL ONCE A DAY
Qty: 90 TABLET | Refills: 3 | Status: ON HOLD | COMMUNITY
Start: 2024-06-17

## 2025-04-24 RX ORDER — CLARITHROMYCIN 500 MG/1
TAKE 1 TABLET BY MOUTH EVERY 12 HOURS FOR 10 DAYS TABLET, FILM COATED ORAL
Qty: 20 EACH | Refills: 0 | Status: ON HOLD | COMMUNITY

## 2025-04-24 RX ORDER — AMOXICILLIN 500 MG/1
TAKE 2 CAPSULES BY MOUTH TWICE DAILY FOR 10 DAYS CAPSULE ORAL
Qty: 40 EACH | Refills: 0 | Status: ON HOLD | COMMUNITY

## 2025-04-24 RX ORDER — OMEPRAZOLE 40 MG/1
1 CAPSULE 30 MINUTES BEFORE BREAKFAST AND DINNER CAPSULE, DELAYED RELEASE ORAL TWICE DAILY
Qty: 180 | Refills: 0 | Status: ON HOLD | COMMUNITY
Start: 2024-06-17

## 2025-04-24 RX ORDER — OMEPRAZOLE 40 MG/1
1 CAPSULE 1/2 TO 1 HOUR BEFORE MORNING MEAL CAPSULE, DELAYED RELEASE ORAL ONCE A DAY
Qty: 90 | Refills: 3 | Status: ACTIVE | COMMUNITY
Start: 2024-10-14

## 2025-04-24 RX ORDER — OMEPRAZOLE 40 MG/1
1 CAPSULE 30 MINUTES BEFORE MORNING MEAL CAPSULE, DELAYED RELEASE ORAL TWICE DAILY
Qty: 180 | Refills: 0 | Status: ON HOLD | COMMUNITY
Start: 2024-07-15

## 2025-04-24 RX ORDER — OMEPRAZOLE 20 MG/1
TAKE 1 TABLET BY MOUTH TWICE DAILY BEFORE MEALS TABLET, DELAYED RELEASE ORAL
Qty: 30 EACH | Refills: 0 | Status: ON HOLD | COMMUNITY

## 2025-04-24 RX ORDER — FAMOTIDINE 40 MG/1
1 TABLET AT BEDTIME TABLET, FILM COATED ORAL ONCE A DAY
Qty: 30 TABLET | Refills: 0 | Status: ACTIVE | COMMUNITY
Start: 2025-03-06

## 2025-04-24 RX ORDER — SUCRALFATE 1 G/1
TAKE 1 TABLET BY MOUTH 4 TIMES DAILY TABLET ORAL
Qty: 120 EACH | Refills: 0 | Status: ON HOLD | COMMUNITY

## 2025-04-24 RX ORDER — FAMOTIDINE 40 MG/1
TAKE 1 TABLET BY MOUTH ONCE DAILY AT BEDTIME TABLET, FILM COATED ORAL
Qty: 30 EACH | Refills: 1 | Status: ON HOLD | COMMUNITY

## 2025-04-24 NOTE — HPI-TODAY'S VISIT:
Patient is a 34-year-old male last seen in the office on 3/6/2025 for functional dyspepsia, IBS-C with overflow diarrhea, mucus in the stool, and left lower quadrant pain.  Patient has been worked up with stool studies that were negative.  He was asked to start MiraLAX and fiber at last visit and was doing a little bit better, but was still having some lower GI symptoms so a stimulant laxative was added every 3 days.  He stopped this after 2 weeks because his bowels were moving better.  Patient was still having some reflux gastritis so famotidine 40 mg nightly was added to his 40 mg of omeprazole before dinner.

## 2025-04-24 NOTE — HPI-OTHER HISTORIES
Procedures:  EGD - 7/1/24 - esophagus normal. gastritis - moderately severe; normal duodenum. Path: focally active gastritis - neg for H Pylori. mild chronic inflammation of esophagus.  Labs: 11/3/2024-stool studies: Bacterial and viral panel negative, ova and parasite negative  11/3/2024-WBC 8.3, RBC 5.1, hemoglobin 14.3, hematocrit 43.7, MCV 85.5, platelets 249, , K4.1, BUN 14, creatinine 0.9, total bili 0.6, AST 16, ALT 23, ALP 77, lipase 31  DI: 11/3/2024-CT abdomen and pelvis-no acute abnormality within the abdomen or pelvis.  11/3/2024-chest x-ray no focal opacity or pleural effusion.  Cardiomediastinal silhouette appears normal.  No acute cardiopulmonary disease.  Hospitalizations:  11/3/2024-ED at [facility]: Patient complaining of dizziness secondary to dehydration due to persistent diarrhea for 5 weeks.  Stool studies negative.  Reporting frequent watery/mucoid stools without blood.  Reports 20 pound weight loss in 1 month.  5/14/24 - ED @[facility] - abd pain/chest pain/dizziness - on famotidine daily. Worse when he eats. Started on omeprazole 20 mg BID and carafate QID. Labs unremarkable.  5/21/24 - ED @[facility] - abd pain/gastritis with a take home test H Pylori (+); Patient given amoxixillin 500 g 2 capsule BID/clarithromycin 500 mg BID for 10 days.  6/4/24 - ED @[facility] - epigastric pain continuing despits H Pylori trmt. CT no acute findings. Labs normal.  6/11/24 - ED @[facility] - Lipase normal, CBC/CMP normal; famotidine 20 mg BID; Sucralafate QID